# Patient Record
Sex: FEMALE | Race: ASIAN | NOT HISPANIC OR LATINO | URBAN - METROPOLITAN AREA
[De-identification: names, ages, dates, MRNs, and addresses within clinical notes are randomized per-mention and may not be internally consistent; named-entity substitution may affect disease eponyms.]

---

## 2021-03-25 ENCOUNTER — INPATIENT (INPATIENT)
Facility: HOSPITAL | Age: 31
LOS: 2 days | Discharge: HOME | End: 2021-03-28
Attending: OBSTETRICS & GYNECOLOGY | Admitting: OBSTETRICS & GYNECOLOGY
Payer: COMMERCIAL

## 2021-03-25 VITALS — OXYGEN SATURATION: 100 %

## 2021-03-25 DIAGNOSIS — Z34.80 ENCOUNTER FOR SUPERVISION OF OTHER NORMAL PREGNANCY, UNSPECIFIED TRIMESTER: ICD-10-CM

## 2021-03-25 DIAGNOSIS — Z3A.00 WEEKS OF GESTATION OF PREGNANCY NOT SPECIFIED: ICD-10-CM

## 2021-03-25 DIAGNOSIS — O26.899 OTHER SPECIFIED PREGNANCY RELATED CONDITIONS, UNSPECIFIED TRIMESTER: ICD-10-CM

## 2021-03-25 LAB
BASOPHILS # BLD AUTO: 0.03 K/UL — SIGNIFICANT CHANGE UP (ref 0–0.2)
BASOPHILS NFR BLD AUTO: 0.3 % — SIGNIFICANT CHANGE UP (ref 0–2)
BLD GP AB SCN SERPL QL: NEGATIVE — SIGNIFICANT CHANGE UP
EOSINOPHIL # BLD AUTO: 0.08 K/UL — SIGNIFICANT CHANGE UP (ref 0–0.5)
EOSINOPHIL NFR BLD AUTO: 0.7 % — SIGNIFICANT CHANGE UP (ref 0–6)
HCT VFR BLD CALC: 30.4 % — LOW (ref 34.5–45)
HGB BLD-MCNC: 9.2 G/DL — LOW (ref 11.5–15.5)
IMM GRANULOCYTES NFR BLD AUTO: 0.9 % — SIGNIFICANT CHANGE UP (ref 0–1.5)
LYMPHOCYTES # BLD AUTO: 1.87 K/UL — SIGNIFICANT CHANGE UP (ref 1–3.3)
LYMPHOCYTES # BLD AUTO: 16.9 % — SIGNIFICANT CHANGE UP (ref 13–44)
MCHC RBC-ENTMCNC: 23.4 PG — LOW (ref 27–34)
MCHC RBC-ENTMCNC: 30.3 GM/DL — LOW (ref 32–36)
MCV RBC AUTO: 77.2 FL — LOW (ref 80–100)
MONOCYTES # BLD AUTO: 0.79 K/UL — SIGNIFICANT CHANGE UP (ref 0–0.9)
MONOCYTES NFR BLD AUTO: 7.1 % — SIGNIFICANT CHANGE UP (ref 2–14)
NEUTROPHILS # BLD AUTO: 8.22 K/UL — HIGH (ref 1.8–7.4)
NEUTROPHILS NFR BLD AUTO: 74.1 % — SIGNIFICANT CHANGE UP (ref 43–77)
NRBC # BLD: 0 /100 WBCS — SIGNIFICANT CHANGE UP (ref 0–0)
PLATELET # BLD AUTO: 367 K/UL — SIGNIFICANT CHANGE UP (ref 150–400)
RBC # BLD: 3.94 M/UL — SIGNIFICANT CHANGE UP (ref 3.8–5.2)
RBC # FLD: 16.3 % — HIGH (ref 10.3–14.5)
RH IG SCN BLD-IMP: POSITIVE — SIGNIFICANT CHANGE UP
SARS-COV-2 RNA SPEC QL NAA+PROBE: SIGNIFICANT CHANGE UP
WBC # BLD: 11.09 K/UL — HIGH (ref 3.8–10.5)
WBC # FLD AUTO: 11.09 K/UL — HIGH (ref 3.8–10.5)

## 2021-03-25 RX ORDER — SODIUM CHLORIDE 9 MG/ML
1000 INJECTION, SOLUTION INTRAVENOUS
Refills: 0 | Status: DISCONTINUED | OUTPATIENT
Start: 2021-03-25 | End: 2021-03-27

## 2021-03-25 RX ORDER — OXYTOCIN 10 UNIT/ML
333.33 VIAL (ML) INJECTION
Qty: 20 | Refills: 0 | Status: DISCONTINUED | OUTPATIENT
Start: 2021-03-25 | End: 2021-03-28

## 2021-03-25 RX ORDER — CITRIC ACID/SODIUM CITRATE 300-500 MG
15 SOLUTION, ORAL ORAL EVERY 6 HOURS
Refills: 0 | Status: DISCONTINUED | OUTPATIENT
Start: 2021-03-25 | End: 2021-03-27

## 2021-03-25 RX ADMIN — SODIUM CHLORIDE 125 MILLILITER(S): 9 INJECTION, SOLUTION INTRAVENOUS at 22:00

## 2021-03-25 NOTE — OB PROVIDER H&P - NS_ZIKATRAVEL_OBGYN_ALL_OB
Rapid intubation needed per attending MD.     0940-2mg of Versed given  0942-150mg Ketamine given  0942-5mg Rocuronium given    Bilateral breath sounds heard after ETT insertion.     Elie Roberson RN    No

## 2021-03-25 NOTE — OB PROVIDER H&P - NSHPPHYSICALEXAM_GEN_ALL_CORE
Vital Signs Last 24 Hrs  T(C): 36.8 (25 Mar 2021 20:39), Max: 37 (25 Mar 2021 19:41)  T(F): 98.24 (25 Mar 2021 20:39), Max: 98.6 (25 Mar 2021 19:41)  HR: 113 (25 Mar 2021 20:53) (107 - 120)  BP: 110/82 (25 Mar 2021 20:53) (103/75 - 144/84)  BP(mean): --  RR: 14 (25 Mar 2021 19:41) (14 - 14)  SpO2: 92% (25 Mar 2021 20:52) (92% - 100%)    General: NAD, A&Ox3  CV: RRR  Lungs: CTA b/l  Abdomen: Soft, NT, gravid

## 2021-03-25 NOTE — OB PROVIDER TRIAGE NOTE - NSOBPROVIDERNOTE_OBGYN_ALL_OB_FT
A/P:  30y  @37wks and 5 days gestation presenting with LOF. Patient r/o for ROM. +FM  -NST  -ALEJANDRO  To be d/w Dr. Ruddy Block PA-C

## 2021-03-25 NOTE — OB PROVIDER H&P - NS_PRENATALMEDS_OBGYN_A_OB
Spoke with mom and gave her advice  She will call back if no better in a few days  Prenatal Vitamins

## 2021-03-25 NOTE — OB PROVIDER H&P - HISTORY OF PRESENT ILLNESS
30y  @37wks and 5 days gestation presenting with LOF. Patient admits to feeling a trickle of fluid @330pm. She states it only happened once and she hasn't felt any more leaking since. She denies a gush or continuous leaking. She denies ctx's or VB. PNC uncomplicated. +FM. GBS -.     POBHx: Denies  PGYNhx: Denies fibroids, ovarian cysts, abnormal pap smears, STD's  PMhx: Hx of tachycardia-no meds, states she saw a MD in [place]  Medications: PNV  Allergies: NKDA  PSHx: Denies  Social Hx: Denies etoh/tobacco/drug use. Denies anxiety/depression    Vital Signs Last 24 Hrs  T(C): 36.8 (25 Mar 2021 20:39), Max: 37 (25 Mar 2021 19:41)  T(F): 98.24 (25 Mar 2021 20:39), Max: 98.6 (25 Mar 2021 19:41)  HR: 113 (25 Mar 2021 20:53) (107 - 120)  BP: 110/82 (25 Mar 2021 20:53) (103/75 - 144/84)  BP(mean): --  RR: 14 (25 Mar 2021 19:41) (14 - 14)  SpO2: 92% (25 Mar 2021 20:52) (92% - 100%)    General: NAD, A&Ox3  CV: RRR  Lungs: CTA b/l  Abdomen: Soft, NT, gravid    SSE: - pooling, - nitrazine, - ferning  VE: 1/50/-3  Bedside sono: Vertex, ALEJANDRO 3 (performed with Dr. Claudio)  EFM: 140/moderate variability/+accels/no decels  [place]: 1 ctx noted

## 2021-03-25 NOTE — OB PROVIDER TRIAGE NOTE - NSHPPHYSICALEXAM_GEN_ALL_CORE
Vital Signs Last 24 Hrs  T(C): 37 (25 Mar 2021 19:41), Max: 37 (25 Mar 2021 19:41)  T(F): 98.6 (25 Mar 2021 19:41), Max: 98.6 (25 Mar 2021 19:41)  HR: 114 (25 Mar 2021 20:01) (108 - 115)  BP: 128/77 (25 Mar 2021 19:54) (128/77 - 144/84)  BP(mean): --  RR: 14 (25 Mar 2021 19:41) (14 - 14)  SpO2: 99% (25 Mar 2021 20:01) (99% - 100%)    General: NAD, A&Ox3  CV: RRR  Lungs: CTA b/l  Abdomen: Soft, NT, gravid

## 2021-03-25 NOTE — OB PROVIDER H&P - ASSESSMENT
A/P:  30y  @37wks and 5 days gestation presenting with LOF. Nitrazine/pooling/fern were all negative but ALEJANDRO performed and noted to be 3. Patient for IOL for suspected PROM@330pm/[place]. +FM. GBS -. EFW 3100g  -Admit to L&D  -Routine labs  -EFM/[place]  -NPO, IVF, Bicitra  -IOL with PO cytotec  -COVID swab  -First BP noted to be mildly elevated but subsequent BP's WNL. Will continue to monitor   -Anesthesia consult, epidural PRN  -Anticipate   D/w Dr. Ruddy Block PA-C

## 2021-03-25 NOTE — OB PROVIDER TRIAGE NOTE - HISTORY OF PRESENT ILLNESS
30y  @37wks and 5 days gestation presenting with LOF. Patient admits to feeling a trickle of fluid @330pm. She states it only happened once and she hasn't felt any more leaking since. She denies a gush or continuous leaking. She denies ctx's or VB. PNC uncomplicated. +FM. GBS -.     POBHx: Denies  PGYNhx: Denies fibroids, ovarian cysts, abnormal pap smears, STD's  PMhx: Hx of tachycardia-no meds, states she saw a MD in [place]  Medications: PNV  Allergies: NKDA  PSHx: Denies  Social Hx: Denies etoh/tobacco/drug use. Denies anxiety/depression    Vital Signs Last 24 Hrs  T(C): 37 (25 Mar 2021 19:41), Max: 37 (25 Mar 2021 19:41)  T(F): 98.6 (25 Mar 2021 19:41), Max: 98.6 (25 Mar 2021 19:41)  HR: 114 (25 Mar 2021 20:01) (108 - 115)  BP: 128/77 (25 Mar 2021 19:54) (128/77 - 144/84)  BP(mean): --  RR: 14 (25 Mar 2021 19:41) (14 - 14)  SpO2: 99% (25 Mar 2021 20:01) (99% - 100%)    General: NAD, A&Ox3  CV: RRR  Lungs: CTA b/l  Abdomen: Soft, NT, gravid    SSE: - pooling, - nitrazine, - ferning  VE: 1/50/-3  EFM: 140/moderate variability/+accels/no decels  [place]: 1 ctx noted

## 2021-03-26 LAB
COVID-19 SPIKE DOMAIN AB INTERP: NEGATIVE — SIGNIFICANT CHANGE UP
COVID-19 SPIKE DOMAIN ANTIBODY RESULT: 0.4 U/ML — SIGNIFICANT CHANGE UP
SARS-COV-2 IGG+IGM SERPL QL IA: 0.4 U/ML — SIGNIFICANT CHANGE UP
SARS-COV-2 IGG+IGM SERPL QL IA: NEGATIVE — SIGNIFICANT CHANGE UP
T PALLIDUM AB TITR SER: NEGATIVE — SIGNIFICANT CHANGE UP

## 2021-03-26 RX ORDER — KETOROLAC TROMETHAMINE 30 MG/ML
30 SYRINGE (ML) INJECTION ONCE
Refills: 0 | Status: DISCONTINUED | OUTPATIENT
Start: 2021-03-26 | End: 2021-03-28

## 2021-03-26 RX ORDER — DIBUCAINE 1 %
1 OINTMENT (GRAM) RECTAL EVERY 6 HOURS
Refills: 0 | Status: DISCONTINUED | OUTPATIENT
Start: 2021-03-26 | End: 2021-03-28

## 2021-03-26 RX ORDER — OXYTOCIN 10 UNIT/ML
4 VIAL (ML) INJECTION
Qty: 30 | Refills: 0 | Status: DISCONTINUED | OUTPATIENT
Start: 2021-03-26 | End: 2021-03-28

## 2021-03-26 RX ORDER — OXYTOCIN 10 UNIT/ML
333.33 VIAL (ML) INJECTION
Qty: 20 | Refills: 0 | Status: DISCONTINUED | OUTPATIENT
Start: 2021-03-26 | End: 2021-03-28

## 2021-03-26 RX ORDER — OXYCODONE HYDROCHLORIDE 5 MG/1
5 TABLET ORAL ONCE
Refills: 0 | Status: DISCONTINUED | OUTPATIENT
Start: 2021-03-26 | End: 2021-03-28

## 2021-03-26 RX ORDER — BENZOCAINE 10 %
1 GEL (GRAM) MUCOUS MEMBRANE EVERY 6 HOURS
Refills: 0 | Status: DISCONTINUED | OUTPATIENT
Start: 2021-03-26 | End: 2021-03-28

## 2021-03-26 RX ORDER — SIMETHICONE 80 MG/1
80 TABLET, CHEWABLE ORAL EVERY 4 HOURS
Refills: 0 | Status: DISCONTINUED | OUTPATIENT
Start: 2021-03-26 | End: 2021-03-28

## 2021-03-26 RX ORDER — AER TRAVELER 0.5 G/1
1 SOLUTION RECTAL; TOPICAL EVERY 4 HOURS
Refills: 0 | Status: DISCONTINUED | OUTPATIENT
Start: 2021-03-26 | End: 2021-03-28

## 2021-03-26 RX ORDER — SODIUM CHLORIDE 9 MG/ML
3 INJECTION INTRAMUSCULAR; INTRAVENOUS; SUBCUTANEOUS EVERY 8 HOURS
Refills: 0 | Status: DISCONTINUED | OUTPATIENT
Start: 2021-03-26 | End: 2021-03-28

## 2021-03-26 RX ORDER — SODIUM CHLORIDE 9 MG/ML
1000 INJECTION INTRAMUSCULAR; INTRAVENOUS; SUBCUTANEOUS ONCE
Refills: 0 | Status: COMPLETED | OUTPATIENT
Start: 2021-03-26 | End: 2021-03-26

## 2021-03-26 RX ORDER — HYDROCORTISONE 1 %
1 OINTMENT (GRAM) TOPICAL EVERY 6 HOURS
Refills: 0 | Status: DISCONTINUED | OUTPATIENT
Start: 2021-03-26 | End: 2021-03-28

## 2021-03-26 RX ORDER — MAGNESIUM HYDROXIDE 400 MG/1
30 TABLET, CHEWABLE ORAL
Refills: 0 | Status: DISCONTINUED | OUTPATIENT
Start: 2021-03-26 | End: 2021-03-28

## 2021-03-26 RX ORDER — OXYCODONE HYDROCHLORIDE 5 MG/1
5 TABLET ORAL
Refills: 0 | Status: DISCONTINUED | OUTPATIENT
Start: 2021-03-26 | End: 2021-03-28

## 2021-03-26 RX ORDER — DIPHENHYDRAMINE HCL 50 MG
25 CAPSULE ORAL EVERY 6 HOURS
Refills: 0 | Status: DISCONTINUED | OUTPATIENT
Start: 2021-03-26 | End: 2021-03-28

## 2021-03-26 RX ORDER — OXYTOCIN 10 UNIT/ML
2 VIAL (ML) INJECTION
Qty: 30 | Refills: 0 | Status: DISCONTINUED | OUTPATIENT
Start: 2021-03-26 | End: 2021-03-28

## 2021-03-26 RX ORDER — IBUPROFEN 200 MG
600 TABLET ORAL EVERY 6 HOURS
Refills: 0 | Status: COMPLETED | OUTPATIENT
Start: 2021-03-26 | End: 2022-02-22

## 2021-03-26 RX ORDER — ACETAMINOPHEN 500 MG
975 TABLET ORAL
Refills: 0 | Status: DISCONTINUED | OUTPATIENT
Start: 2021-03-26 | End: 2021-03-28

## 2021-03-26 RX ORDER — TETANUS TOXOID, REDUCED DIPHTHERIA TOXOID AND ACELLULAR PERTUSSIS VACCINE, ADSORBED 5; 2.5; 8; 8; 2.5 [IU]/.5ML; [IU]/.5ML; UG/.5ML; UG/.5ML; UG/.5ML
0.5 SUSPENSION INTRAMUSCULAR ONCE
Refills: 0 | Status: DISCONTINUED | OUTPATIENT
Start: 2021-03-26 | End: 2021-03-28

## 2021-03-26 RX ORDER — LANOLIN
1 OINTMENT (GRAM) TOPICAL EVERY 6 HOURS
Refills: 0 | Status: DISCONTINUED | OUTPATIENT
Start: 2021-03-26 | End: 2021-03-28

## 2021-03-26 RX ORDER — PRAMOXINE HYDROCHLORIDE 150 MG/15G
1 AEROSOL, FOAM RECTAL EVERY 4 HOURS
Refills: 0 | Status: DISCONTINUED | OUTPATIENT
Start: 2021-03-26 | End: 2021-03-28

## 2021-03-26 RX ADMIN — Medication 4 MILLIUNIT(S)/MIN: at 18:00

## 2021-03-26 RX ADMIN — SODIUM CHLORIDE 1000 MILLILITER(S): 9 INJECTION INTRAMUSCULAR; INTRAVENOUS; SUBCUTANEOUS at 20:30

## 2021-03-26 RX ADMIN — Medication 2 MILLIUNIT(S)/MIN: at 18:46

## 2021-03-26 RX ADMIN — Medication 1000 MILLIUNIT(S)/MIN: at 22:51

## 2021-03-26 NOTE — CHART NOTE - NSCHARTNOTEFT_GEN_A_CORE
Ob attending note    Pt doing well after epi placement.    Vital Signs Last 24 Hrs  T(C): 37 (26 Mar 2021 14:28), Max: 37 (25 Mar 2021 19:41)  T(F): 98.6 (26 Mar 2021 12:56), Max: 98.6 (25 Mar 2021 19:41)  HR: 72 (26 Mar 2021 15:20) (63 - 120)  BP: 113/64 (26 Mar 2021 15:20) (103/75 - 144/84)  BP(mean): --  RR: 20 (26 Mar 2021 14:28) (14 - 20)  SpO2: 100% (26 Mar 2021 15:17) (92% - 100%)    VE: 2.5/70/-3  EFM: 135bl/mod/+accels/-decels  [place]: q2min    IOL for likely PROM/oligo  -pit for IOL prn  -efm/toco  -peanut ball     MD Sen
Ob attending note    Pt examined for cervical change.    Vital Signs Last 24 Hrs  T(C): 36.5 (26 Mar 2021 17:31), Max: 37 (25 Mar 2021 19:41)  T(F): 97.7 (26 Mar 2021 17:31), Max: 98.6 (25 Mar 2021 19:41)  HR: 82 (26 Mar 2021 17:47) (60 - 120)  BP: 120/62 (26 Mar 2021 17:47) (103/75 - 144/84)  BP(mean): --  RR: 18 (26 Mar 2021 17:31) (14 - 20)  SpO2: 99% (26 Mar 2021 17:47) (92% - 100%)    VE: 4/80/-3  EFM: 135bl/mod/accels/occasional variable decels; overall reassuring  [place]: q2-4min; IUPC placed for pit titration    Plan:  -c/w pit for induction, titrate with IUPC  -efm/toco  -emmett Chatterjee MD
PA Labor Note    Pt. with PPROM with PO cytotec IOL, c/o painful ctx, requesting epidural.  EFM:  mod. variability, (+) 15 BPM accels, ctx Q 2 min.  VE:  2/70/-3/P/mod. tone  Pt. to receive epidural.    JERMAINE Gan

## 2021-03-26 NOTE — OB PROVIDER LABOR PROGRESS NOTE - ASSESSMENT
A/P:  - EFM: Cat II, moderate variability, overall reassuring  - anticipate   - Dr. Weir en route    MORIS ToroC

## 2021-03-26 NOTE — OB PROVIDER LABOR PROGRESS NOTE - NS_SUBJECTIVE/OBJECTIVE_OBGYN_ALL_OB_FT
OB PA Progress Note    Pt seen and evaluated, c/o increased rectal pressure.    Exam  VSS  SVE: 9/100/0  EFM: 140bpm, moderate variability, +accels, +variable decels, non-recurrent, improved with amnioinfusion  [place]: Q2min
Pt comfortable, Examined to assess cervical change

## 2021-03-26 NOTE — OB RN DELIVERY SUMMARY - NS_SEPSISRSKCALC_OBGYN_ALL_OB_FT
EOS calculated successfully. EOS Risk Factor: 0.5/1000 live births ([facility] national incidence); GA=37w6d; Temp=99.86; ROM=6.9; GBS='Negative'; Antibiotics='No antibiotics or any antibiotics < 2 hrs prior to birth'

## 2021-03-26 NOTE — OB PROVIDER DELIVERY SUMMARY - NSPROVIDERDELIVERYNOTE_OBGYN_ALL_OB_FT
Patient fully dilated and pushing. Spontaneous vaginal delivery of liveborn infant from DAVY position. Head, shoulders, and body delivered with ease. Infant was suctioned. Clear amniotic fluid. Cord was clamped and cut after 60 seconds of delayed cord clamping and infant was passed to mother/peds. Placenta delivered intact with a 3 vessel cord. Fundal massage was given and uterine fundus was found to be firm. Vaginal exam revealed an intact cervix, vaginal walls and sulci. RML performed and repaired. Patient had a 2nd degree laceration in the perineum that was repaired with vicryl suture. Excellent hemostasis was noted. Patient was stable. Count was correct x 2. EBL 400cc.    Surgeon Dr. Weir. Assistant Evelyn Coker PA-C

## 2021-03-26 NOTE — PRE-ANESTHESIA EVALUATION ADULT - NSANTHADDINFOFT_GEN_ALL_CORE
epidural for labor explained to pt in detail;  risks include but not limited to HA, failure, nv injury.  All questions answered.

## 2021-03-27 RX ORDER — IBUPROFEN 200 MG
600 TABLET ORAL EVERY 6 HOURS
Refills: 0 | Status: DISCONTINUED | OUTPATIENT
Start: 2021-03-27 | End: 2021-03-28

## 2021-03-27 RX ADMIN — Medication 1 TABLET(S): at 13:14

## 2021-03-27 RX ADMIN — Medication 600 MILLIGRAM(S): at 14:00

## 2021-03-27 RX ADMIN — Medication 600 MILLIGRAM(S): at 13:13

## 2021-03-27 NOTE — PROGRESS NOTE ADULT - ATTENDING COMMENTS
PPD#1  Pt doing well, VS stable - noted mild tachycardia (pt notes h/o tachycardia, last VS nl  voiding spontaneously, tolerating PO, ambulating  continue with postpartum care    MD Sen

## 2021-03-28 VITALS
RESPIRATION RATE: 18 BRPM | HEART RATE: 91 BPM | DIASTOLIC BLOOD PRESSURE: 73 MMHG | SYSTOLIC BLOOD PRESSURE: 114 MMHG | OXYGEN SATURATION: 97 % | TEMPERATURE: 98 F

## 2021-03-28 PROCEDURE — 85025 COMPLETE CBC W/AUTO DIFF WBC: CPT

## 2021-03-28 PROCEDURE — 86900 BLOOD TYPING SEROLOGIC ABO: CPT

## 2021-03-28 PROCEDURE — G0463: CPT

## 2021-03-28 PROCEDURE — 59025 FETAL NON-STRESS TEST: CPT

## 2021-03-28 PROCEDURE — 59050 FETAL MONITOR W/REPORT: CPT

## 2021-03-28 PROCEDURE — 86850 RBC ANTIBODY SCREEN: CPT

## 2021-03-28 PROCEDURE — 86780 TREPONEMA PALLIDUM: CPT

## 2021-03-28 PROCEDURE — 86901 BLOOD TYPING SEROLOGIC RH(D): CPT

## 2021-03-28 PROCEDURE — 86769 SARS-COV-2 COVID-19 ANTIBODY: CPT

## 2021-03-28 PROCEDURE — 87635 SARS-COV-2 COVID-19 AMP PRB: CPT

## 2021-03-28 RX ADMIN — Medication 600 MILLIGRAM(S): at 07:00

## 2021-03-28 RX ADMIN — Medication 1 TABLET(S): at 15:02

## 2021-03-28 RX ADMIN — Medication 975 MILLIGRAM(S): at 15:02

## 2021-03-28 RX ADMIN — Medication 600 MILLIGRAM(S): at 06:16

## 2021-03-28 NOTE — DISCHARGE NOTE OB - PATIENT PORTAL LINK FT
You can access the FollowMyHealth Patient Portal offered by [facility] by registering at the following website: [email or web address]. By joining [employer]’s FollowMyHealth portal, you will also be able to view your health information using other applications (apps) compatible with our system.

## 2021-03-28 NOTE — DISCHARGE NOTE OB - MEDICATION SUMMARY - MEDICATIONS TO TAKE
I will START or STAY ON the medications listed below when I get home from the hospital:    Motrin 600 mg oral tablet  -- 1 tab(s) by mouth every 6 hours  -- Indication: For pain med    Tylenol 325 mg oral capsule  -- 2 cap(s) by mouth every 8 hours, As Needed  -- Indication: For pain med    Prenatal Multivitamins oral tablet  -- Indication: For home med

## 2021-03-28 NOTE — DISCHARGE NOTE OB - CARE PROVIDER_API CALL
Eulalia Weir  RESIDENCY - OBSTETRIC-GYN  [address] [place] [address]  Phone: [phone]  Fax: [phone]  Follow Up Time:

## 2021-03-28 NOTE — PROGRESS NOTE ADULT - SUBJECTIVE AND OBJECTIVE BOX
OB Progress Note:  PPD#2    S: 29yo G P PPD#2 s/p . Patient feels well. Pain is well controlled. She is tolerating a regular diet and passing flatus. She is voiding spontaneously, and ambulating without difficulty. Denies CP/SOB. Denies lightheadedness/dizziness. Denies N/V.    O:  Vitals:   Vital Signs Last 24 Hrs  T(C): 36.6 (28 Mar 2021 09:00), Max: 36.9 (27 Mar 2021 17:33)  T(F): 97.9 (28 Mar 2021 09:00), Max: 98.5 (27 Mar 2021 17:33)  HR: 91 (28 Mar 2021 09:00) (82 - 106)  BP: 114/73 (28 Mar 2021 09:00) (102/65 - 114/73)  RR: 18 (28 Mar 2021 09:00) (18 - 18)  SpO2: 97% (28 Mar 2021 09:00) (95% - 99%)    MEDICATIONS  (STANDING):  acetaminophen   Tablet .. 975 milliGRAM(s) Oral <User Schedule>  diphtheria/tetanus/pertussis (acellular) Vaccine (ADAcel) 0.5 milliLiter(s) IntraMuscular once  ibuprofen  Tablet. 600 milliGRAM(s) Oral every 6 hours  ketorolac   Injectable 30 milliGRAM(s) IV Push once  misoprostol Oral Solution 60 MICROGram(s) Oral every 2 hours  oxytocin Infusion 333.333 milliUNIT(s)/Min (1000 mL/Hr) IV Continuous <Continuous>  oxytocin Infusion 333.333 milliUNIT(s)/Min (1000 mL/Hr) IV Continuous <Continuous>  oxytocin Infusion. 2 milliUNIT(s)/Min (2 mL/Hr) IV Continuous <Continuous>  oxytocin Infusion. 4 milliUNIT(s)/Min (4 mL/Hr) IV Continuous <Continuous>  prenatal multivitamin 1 Tablet(s) Oral daily  sodium chloride 0.9% lock flush 3 milliLiter(s) IV Push every 8 hours    MEDICATIONS  (PRN):  benzocaine 20%/menthol 0.5% Spray 1 Spray(s) Topical every 6 hours PRN for Perineal discomfort  dibucaine 1% Ointment 1 Application(s) Topical every 6 hours PRN Perineal discomfort  diphenhydrAMINE 25 milliGRAM(s) Oral every 6 hours PRN Pruritus  hydrocortisone 1% Cream 1 Application(s) Topical every 6 hours PRN Moderate Pain (4-6)  lanolin Ointment 1 Application(s) Topical every 6 hours PRN nipple soreness  magnesium hydroxide Suspension 30 milliLiter(s) Oral two times a day PRN Constipation  oxyCODONE    IR 5 milliGRAM(s) Oral every 3 hours PRN Moderate to Severe Pain (4-10)  oxyCODONE    IR 5 milliGRAM(s) Oral once PRN Moderate to Severe Pain (4-10)  pramoxine 1%/zinc 5% Cream 1 Application(s) Topical every 4 hours PRN Moderate Pain (4-6)  simethicone 80 milliGRAM(s) Chew every 4 hours PRN Gas  witch hazel Pads 1 Application(s) Topical every 4 hours PRN Perineal discomfort      Labs:  Blood type: B Positive  Rubella IgG: RPR: Negative                          9.2<L>   11.09<H> >-----------< 367    (  @ 22:19 )             30.4<L>    Physical Exam:  General: NAD  Abdomen: soft, non-tender, non-distended, fundus firm  Vaginal: Lochia wnl  Extremities: No erythema/edema    
PA Postpartum Note- PPD#1    Prenatal Labs  Blood type: B Positive  Rubella IgG: IMMune  RPR: Negative        S:Patient w/o complaints, pain is controlled.    Pt is OOB, tolerating PO, voiding. Lochia WNL.     O:  Vital Signs Last 24 Hrs  T(C): 36.6 (27 Mar 2021 06:52), Max: 37.7 (26 Mar 2021 21:29)  T(F): 97.9 (27 Mar 2021 06:52), Max: 99.86 (26 Mar 2021 21:29)  HR: 112 (27 Mar 2021 06:52) (60 - 133)  BP: 105/73 (27 Mar 2021 06:52) (97/58 - 135/67)  BP(mean): 72 (27 Mar 2021 00:30) (71 - 91)  RR: 19 (27 Mar 2021 06:52) (16 - 20)  SpO2: 99% (27 Mar 2021 06:52) (84% - 100%)     Gen: NAD  Abdomen: Soft, nontender, non-distended, fundus firm.  Vaginal: Lochia WNL,    Ext: Neg edema, Neg calf tenderness    LABS:    Hemoglobin: 9.2 g/dL (03-25 @ 22:19)      Hematocrit: 30.4 % (03-25 @ 22:19)

## 2021-03-28 NOTE — PROGRESS NOTE ADULT - ASSESSMENT
A/P: 31yo GP PPD#2 s/p .  Patient is stable and doing well post-partum.    - Pain well controlled, continue current pain regimen  - Increase ambulation, SCDs when not ambulating  - Continue regular diet  - Discharge planning     MD Sen
A/P:  30y G11 PPD # 1   S/P     Doing Well    PMHx: Hx of tachycardia-no meds, states she saw a MD in [place]  Current Issues: mildly tachycardic- pt has h/o

## 2021-03-28 NOTE — DISCHARGE NOTE OB - CARE PLAN
Principal Discharge DX:	 (normal spontaneous vaginal delivery)  Goal:	continue routine postpartum care  Assessment and plan of treatment:	Take tylenol and motrin as directed, alternating every 3 hours.   Continue iron and prenatal vitamin daily. Take colace and mylicon as needed for constipation and gas pain.   Nothing in the vagina and no exercise until 6 week visit. You may continue bleeding for several weeks.  Follow up in the office in 6 weeks for full postpartum visit.   Call the office for appointment confirmation and with any concerns, including breast infection, wound infection, postpartum depression, high blood pressure, and painful calves.

## 2021-03-28 NOTE — DISCHARGE NOTE OB - PLAN OF CARE
continue routine postpartum care Take tylenol and motrin as directed, alternating every 3 hours.   Continue iron and prenatal vitamin daily. Take colace and mylicon as needed for constipation and gas pain.   Nothing in the vagina and no exercise until 6 week visit. You may continue bleeding for several weeks.  Follow up in the office in 6 weeks for full postpartum visit.   Call the office for appointment confirmation and with any concerns, including breast infection, wound infection, postpartum depression, high blood pressure, and painful calves.

## 2022-05-02 NOTE — OB PROVIDER TRIAGE NOTE - GRAVIDA, OB PROFILE
To talk to our  -  [phone]   Press 0 - to talk to Yesica  8:30 am -5:00 pm  After those hours or if you press 1 or 2 - Central scheduling will answer.  Call  for same day and short notice appointments.    Video Appointments -Live well hugo  Help Desk for Patients  [phone]      Closest lab and radiology department  [facility] Information -  [address] [place] [address] [phone]  8 am to 4 pm   No appointment needed.     Labs and other Test Results - These may take 1 and up to 7 days depending on the test.   Results will be shared with you over the patient portal as these are resulted.         
1

## 2023-08-07 PROBLEM — R00.0 TACHYCARDIA, UNSPECIFIED: Chronic | Status: ACTIVE | Noted: 2021-03-25

## 2023-08-31 ENCOUNTER — INPATIENT (INPATIENT)
Facility: HOSPITAL | Age: 33
LOS: 0 days | Discharge: HOME | End: 2023-09-01
Attending: OBSTETRICS & GYNECOLOGY | Admitting: OBSTETRICS & GYNECOLOGY
Payer: COMMERCIAL

## 2023-08-31 VITALS — SYSTOLIC BLOOD PRESSURE: 124 MMHG | HEART RATE: 98 BPM | DIASTOLIC BLOOD PRESSURE: 82 MMHG

## 2023-08-31 DIAGNOSIS — Z3A.39 39 WEEKS GESTATION OF PREGNANCY: ICD-10-CM

## 2023-08-31 DIAGNOSIS — Z98.890 OTHER SPECIFIED POSTPROCEDURAL STATES: Chronic | ICD-10-CM

## 2023-08-31 LAB
ANISOCYTOSIS BLD QL: SIGNIFICANT CHANGE UP
BASOPHILS # BLD AUTO: 0.17 K/UL — SIGNIFICANT CHANGE UP (ref 0–0.2)
BASOPHILS NFR BLD AUTO: 1.9 % — SIGNIFICANT CHANGE UP (ref 0–2)
EOSINOPHIL # BLD AUTO: 0 K/UL — SIGNIFICANT CHANGE UP (ref 0–0.5)
EOSINOPHIL NFR BLD AUTO: 0 % — SIGNIFICANT CHANGE UP (ref 0–6)
GIANT PLATELETS BLD QL SMEAR: PRESENT — SIGNIFICANT CHANGE UP
HCT VFR BLD CALC: 35.6 % — SIGNIFICANT CHANGE UP (ref 34.5–45)
HGB BLD-MCNC: 11.2 G/DL — LOW (ref 11.5–15.5)
LYMPHOCYTES # BLD AUTO: 1.45 K/UL — SIGNIFICANT CHANGE UP (ref 1–3.3)
LYMPHOCYTES # BLD AUTO: 16.5 % — SIGNIFICANT CHANGE UP (ref 13–44)
MANUAL SMEAR VERIFICATION: SIGNIFICANT CHANGE UP
MCHC RBC-ENTMCNC: 26.1 PG — LOW (ref 27–34)
MCHC RBC-ENTMCNC: 31.5 GM/DL — LOW (ref 32–36)
MCV RBC AUTO: 83 FL — SIGNIFICANT CHANGE UP (ref 80–100)
MICROCYTES BLD QL: SLIGHT — SIGNIFICANT CHANGE UP
MONOCYTES # BLD AUTO: 0.56 K/UL — SIGNIFICANT CHANGE UP (ref 0–0.9)
MONOCYTES NFR BLD AUTO: 6.4 % — SIGNIFICANT CHANGE UP (ref 2–14)
NEUTROPHILS # BLD AUTO: 6.6 K/UL — SIGNIFICANT CHANGE UP (ref 1.8–7.4)
NEUTROPHILS NFR BLD AUTO: 75.2 % — SIGNIFICANT CHANGE UP (ref 43–77)
PLAT MORPH BLD: NORMAL — SIGNIFICANT CHANGE UP
PLATELET # BLD AUTO: 310 K/UL — SIGNIFICANT CHANGE UP (ref 150–400)
POLYCHROMASIA BLD QL SMEAR: SLIGHT — SIGNIFICANT CHANGE UP
RBC # BLD: 4.29 M/UL — SIGNIFICANT CHANGE UP (ref 3.8–5.2)
RBC # FLD: 26.6 % — HIGH (ref 10.3–14.5)
RBC BLD AUTO: ABNORMAL
T PALLIDUM AB TITR SER: NEGATIVE — SIGNIFICANT CHANGE UP
WBC # BLD: 8.77 K/UL — SIGNIFICANT CHANGE UP (ref 3.8–10.5)
WBC # FLD AUTO: 8.77 K/UL — SIGNIFICANT CHANGE UP (ref 3.8–10.5)

## 2023-08-31 RX ORDER — SODIUM CHLORIDE 9 MG/ML
500 INJECTION, SOLUTION INTRAVENOUS ONCE
Refills: 0 | Status: COMPLETED | OUTPATIENT
Start: 2023-08-31 | End: 2023-08-31

## 2023-08-31 RX ORDER — AER TRAVELER 0.5 G/1
1 SOLUTION RECTAL; TOPICAL EVERY 4 HOURS
Refills: 0 | Status: DISCONTINUED | OUTPATIENT
Start: 2023-08-31 | End: 2023-09-01

## 2023-08-31 RX ORDER — OXYTOCIN 10 UNIT/ML
4 VIAL (ML) INJECTION
Qty: 30 | Refills: 0 | Status: DISCONTINUED | OUTPATIENT
Start: 2023-08-31 | End: 2023-09-01

## 2023-08-31 RX ORDER — MAGNESIUM HYDROXIDE 400 MG/1
30 TABLET, CHEWABLE ORAL
Refills: 0 | Status: DISCONTINUED | OUTPATIENT
Start: 2023-08-31 | End: 2023-09-01

## 2023-08-31 RX ORDER — LANOLIN
1 OINTMENT (GRAM) TOPICAL EVERY 6 HOURS
Refills: 0 | Status: DISCONTINUED | OUTPATIENT
Start: 2023-08-31 | End: 2023-09-01

## 2023-08-31 RX ORDER — KETOROLAC TROMETHAMINE 30 MG/ML
30 SYRINGE (ML) INJECTION ONCE
Refills: 0 | Status: DISCONTINUED | OUTPATIENT
Start: 2023-08-31 | End: 2023-08-31

## 2023-08-31 RX ORDER — DIBUCAINE 1 %
1 OINTMENT (GRAM) RECTAL EVERY 6 HOURS
Refills: 0 | Status: DISCONTINUED | OUTPATIENT
Start: 2023-08-31 | End: 2023-09-01

## 2023-08-31 RX ORDER — OXYTOCIN 10 UNIT/ML
41.67 VIAL (ML) INJECTION
Qty: 20 | Refills: 0 | Status: DISCONTINUED | OUTPATIENT
Start: 2023-08-31 | End: 2023-09-01

## 2023-08-31 RX ORDER — PRAMOXINE HYDROCHLORIDE 150 MG/15G
1 AEROSOL, FOAM RECTAL EVERY 4 HOURS
Refills: 0 | Status: DISCONTINUED | OUTPATIENT
Start: 2023-08-31 | End: 2023-09-01

## 2023-08-31 RX ORDER — OXYCODONE HYDROCHLORIDE 5 MG/1
5 TABLET ORAL ONCE
Refills: 0 | Status: DISCONTINUED | OUTPATIENT
Start: 2023-08-31 | End: 2023-09-01

## 2023-08-31 RX ORDER — IBUPROFEN 200 MG
1 TABLET ORAL
Qty: 0 | Refills: 0 | DISCHARGE

## 2023-08-31 RX ORDER — SODIUM CHLORIDE 9 MG/ML
3 INJECTION INTRAMUSCULAR; INTRAVENOUS; SUBCUTANEOUS EVERY 8 HOURS
Refills: 0 | Status: DISCONTINUED | OUTPATIENT
Start: 2023-08-31 | End: 2023-09-01

## 2023-08-31 RX ORDER — SODIUM CHLORIDE 9 MG/ML
1000 INJECTION, SOLUTION INTRAVENOUS
Refills: 0 | Status: DISCONTINUED | OUTPATIENT
Start: 2023-08-31 | End: 2023-08-31

## 2023-08-31 RX ORDER — OXYTOCIN 10 UNIT/ML
333.33 VIAL (ML) INJECTION
Qty: 20 | Refills: 0 | Status: DISCONTINUED | OUTPATIENT
Start: 2023-08-31 | End: 2023-08-31

## 2023-08-31 RX ORDER — OXYCODONE HYDROCHLORIDE 5 MG/1
5 TABLET ORAL
Refills: 0 | Status: DISCONTINUED | OUTPATIENT
Start: 2023-08-31 | End: 2023-09-01

## 2023-08-31 RX ORDER — ACETAMINOPHEN 500 MG
975 TABLET ORAL
Refills: 0 | Status: DISCONTINUED | OUTPATIENT
Start: 2023-08-31 | End: 2023-09-01

## 2023-08-31 RX ORDER — TETANUS TOXOID, REDUCED DIPHTHERIA TOXOID AND ACELLULAR PERTUSSIS VACCINE, ADSORBED 5; 2.5; 8; 8; 2.5 [IU]/.5ML; [IU]/.5ML; UG/.5ML; UG/.5ML; UG/.5ML
0.5 SUSPENSION INTRAMUSCULAR ONCE
Refills: 0 | Status: DISCONTINUED | OUTPATIENT
Start: 2023-08-31 | End: 2023-09-01

## 2023-08-31 RX ORDER — CHLORHEXIDINE GLUCONATE 213 G/1000ML
1 SOLUTION TOPICAL DAILY
Refills: 0 | Status: DISCONTINUED | OUTPATIENT
Start: 2023-08-31 | End: 2023-08-31

## 2023-08-31 RX ORDER — HYDROCORTISONE 1 %
1 OINTMENT (GRAM) TOPICAL EVERY 6 HOURS
Refills: 0 | Status: DISCONTINUED | OUTPATIENT
Start: 2023-08-31 | End: 2023-09-01

## 2023-08-31 RX ORDER — IBUPROFEN 200 MG
600 TABLET ORAL EVERY 6 HOURS
Refills: 0 | Status: COMPLETED | OUTPATIENT
Start: 2023-08-31 | End: 2024-07-29

## 2023-08-31 RX ORDER — DIPHENHYDRAMINE HCL 50 MG
25 CAPSULE ORAL EVERY 6 HOURS
Refills: 0 | Status: DISCONTINUED | OUTPATIENT
Start: 2023-08-31 | End: 2023-09-01

## 2023-08-31 RX ORDER — IBUPROFEN 200 MG
600 TABLET ORAL EVERY 6 HOURS
Refills: 0 | Status: DISCONTINUED | OUTPATIENT
Start: 2023-08-31 | End: 2023-09-01

## 2023-08-31 RX ORDER — ACETAMINOPHEN 500 MG
2 TABLET ORAL
Qty: 0 | Refills: 0 | DISCHARGE

## 2023-08-31 RX ORDER — CITRIC ACID/SODIUM CITRATE 300-500 MG
15 SOLUTION, ORAL ORAL EVERY 6 HOURS
Refills: 0 | Status: DISCONTINUED | OUTPATIENT
Start: 2023-08-31 | End: 2023-08-31

## 2023-08-31 RX ORDER — BENZOCAINE 10 %
1 GEL (GRAM) MUCOUS MEMBRANE EVERY 6 HOURS
Refills: 0 | Status: DISCONTINUED | OUTPATIENT
Start: 2023-08-31 | End: 2023-09-01

## 2023-08-31 RX ORDER — SIMETHICONE 80 MG/1
80 TABLET, CHEWABLE ORAL EVERY 4 HOURS
Refills: 0 | Status: DISCONTINUED | OUTPATIENT
Start: 2023-08-31 | End: 2023-09-01

## 2023-08-31 RX ADMIN — Medication 600 MILLIGRAM(S): at 21:49

## 2023-08-31 RX ADMIN — SODIUM CHLORIDE 125 MILLILITER(S): 9 INJECTION, SOLUTION INTRAVENOUS at 02:45

## 2023-08-31 RX ADMIN — Medication 975 MILLIGRAM(S): at 23:39

## 2023-08-31 RX ADMIN — Medication 30 MILLIGRAM(S): at 13:40

## 2023-08-31 RX ADMIN — SODIUM CHLORIDE 125 MILLILITER(S): 9 INJECTION, SOLUTION INTRAVENOUS at 02:00

## 2023-08-31 RX ADMIN — Medication 600 MILLIGRAM(S): at 20:49

## 2023-08-31 RX ADMIN — SODIUM CHLORIDE 1000 MILLILITER(S): 9 INJECTION, SOLUTION INTRAVENOUS at 06:41

## 2023-08-31 RX ADMIN — Medication 125 MILLIUNIT(S)/MIN: at 11:52

## 2023-08-31 RX ADMIN — Medication 125 MILLIUNIT(S)/MIN: at 14:54

## 2023-08-31 RX ADMIN — Medication 4 MILLIUNIT(S)/MIN: at 08:49

## 2023-08-31 NOTE — OB RN DELIVERY SUMMARY - NS_SEPSISRSKCALC_OBGYN_ALL_OB_FT
EOS calculated successfully. EOS Risk Factor: 0.5/1000 live births ([facility] national incidence); GA=39w;Temp=98.06; ROM=1.567; GBS='Negative'; Antibiotics='No antibiotics or any antibiotics < 2 hrs prior to birth'

## 2023-08-31 NOTE — OB PROVIDER H&P - ASSESSMENT
Assessment  32y   @ 39w presents for eIOL.     Plan  1. Admit to L+D. Routine Labs. IVF.  2. IOL w/ buccal cytotec.  3. Fetus: cat 1 tracing. VTX. [id number] extrapolated from sono 2 weeks Continuous EFM. Sono. No concerns.  4. Prenatal issues: anemia on iron  5. GBS (-)  6. Pain: epidural PRN    Plan per attending physician, Dr. Silvio De Paz, PGY-1

## 2023-08-31 NOTE — OB PROVIDER LABOR PROGRESS NOTE - ASSESSMENT
Plan: 32y y/o  @ 39w. FHT recovered from deceleration, no terbutaline administered    - Continuous EFM, [place]  - Con't IVF bolus  - last cytotec administered at 530a, will reassess induction agent at 830a    d/w attending physician Dr. Silvio Rudolph MD  PGY-2
elective IOL  AROM clear  on pit 2mu/min  cont induction  ant   Alda Espinoza MD

## 2023-08-31 NOTE — OB PROVIDER DELIVERY SUMMARY - NSPROVIDERDELIVERYNOTE_OBGYN_ALL_OB_FT
Pt fully and pushing    Delivery of a Viable female infant in the DAVY position. Head, shoulders and body delivered easily. Nuchal cord x1 and reduced. Delayed cord clamping and cut. Infant was passed to mother. Placenta delivered spontaneously and intact with a 3 vessel cord. Fundal massage was given and uterine fundus was found to be firm. Vaginal exam revealed an intact cervix, vaginal walls and sulci. Patient had a 2nd degree perineal laceration which was repaired with a 2.0 and 3.0 chromic gut in normal fashion. Excellent hemostasis was noted. Patient and infant in stable condition. Count was correct x 2.    1000mg rectal cytotec administered    EBL: 300ml Pt fully and pushing    Delivery of a Viable male infant in the DAVY position. Head, shoulders and body delivered easily. Loose nuchal cord x1 and reduced. Delayed cord clamping and cut. Infant was passed to mother. Placenta delivered spontaneously and intact with a 3 vessel cord. Fundal massage was given and uterine fundus was found to be firm. Vaginal exam revealed an intact cervix, vaginal walls and sulci. Patient had a 2nd degree perineal laceration which was repaired with a 2.0 and 3.0 chromic gut in normal fashion. Excellent hemostasis was noted. Patient and infant in stable condition. Count was correct x 2.    1000mg rectal cytotec administered    EBL: 300ml

## 2023-08-31 NOTE — PRE-ANESTHESIA EVALUATION ADULT - NSANTHPMHFT_GEN_ALL_CORE
32F denies PMH. No significant hx of cv/pulm dz, Denies exertional CP/SOB. No signs of active cad/chf. Denies hx of asthma, HTN, bleeding d/o, back issues.

## 2023-08-31 NOTE — OB PROVIDER LABOR PROGRESS NOTE - NS_SUBJECTIVE/OBJECTIVE_OBGYN_ALL_OB_FT
R2 Labor & Delivery Progress Note     Pt seen & examined at bedside for vaginal exam due to prolonged decel down to 60s lasting 3-4min. Abdomen is soft, not shanti    T(C): 36.6 (08-31-23 @ 06:35), Max: 36.6 (08-31-23 @ 01:30)  HR: 81 (08-31-23 @ 06:56) (70 - 102)  BP: 106/59 (08-31-23 @ 06:36) (106/59 - 124/82)  RR: 18 (08-31-23 @ 06:35) (18 - 18)  SpO2: 100% (08-31-23 @ 06:56) (96% - 100%)
comfortable
NP Chart Note:    Notified by the RN for increased rectal pressure after epidural placement.  The patient was examined and found to be 10/100/+2.  Will start pushing    d/w Dr. Olga Cruz NP

## 2023-08-31 NOTE — OB PROVIDER DELIVERY SUMMARY - NS_DELIVERYASSIST1_OBGYN_ALL_OB_FT
Nicole Sweeney MD
Render Note In Bullet Format When Appropriate: No
Duration Of Freeze Thaw-Cycle (Seconds): 0
Post-Care Instructions: I reviewed with the patient in detail post-care instructions. Patient is to wear sunprotection, and avoid picking at any of the treated lesions. Pt may apply Vaseline to crusted or scabbing areas.
Consent: The patient's consent was obtained including but not limited to risks of crusting, scabbing, blistering, scarring, darker or lighter pigmentary change, recurrence, incomplete removal and infection.
Detail Level: Detailed

## 2023-08-31 NOTE — OB RN PATIENT PROFILE - FALL HARM RISK - UNIVERSAL INTERVENTIONS
Bed in lowest position, wheels locked, appropriate side rails in place/Call bell, personal items and telephone in reach/Instruct patient to call for assistance before getting out of bed or chair/Non-slip footwear when patient is out of bed/[place] to call system/Physically safe environment - no spills, clutter or unnecessary equipment/Purposeful Proactive Rounding/Room/bathroom lighting operational, light cord in reach

## 2023-08-31 NOTE — OB PROVIDER H&P - NSLOWPPHRISK_OBGYN_A_OB
No previous uterine incision/[place] Pregnancy/Less than or equal to 4 previous vaginal births/No known bleeding disorder/No history of postpartum hemorrhage/No other PPH risks indicated

## 2023-08-31 NOTE — OB PROVIDER DELIVERY SUMMARY - NSSELHIDDEN_OBGYN_ALL_OB_FT
[NS_DeliveryAttending1_OBGYN_ALL_OB_FT:[id number]],[NS_DeliveryAssist1_OBGYN_ALL_OB_FT:[id number]]

## 2023-08-31 NOTE — OB PROVIDER H&P - HISTORY OF PRESENT ILLNESS
R1 Admission H&P    Subjective  HPI: 32y  @ 39w presents for eIOL.    +FM. -LOF. -CTXs. -VB. Pt denies any other concerns.    – PNC: Anemia on PO iron. GBS neg. EFW 3600g extraplolated from patient reported sono from 2 weeks ago  – OBHx:   G1-   FT, induction for PROM 3200g  – GynHx: hx fibroids unknown size or location (patient reported told not an issue) denies fibroids, cysts, endometriosis, abnormal pap smears, STIs  – PMH: denies  – PSH: cardiac ablation for tachycardia ()   – Psych: denies   – Social: denies   – Meds: PNV, iron   – Allergies: NKDA  – Will accept blood transfusions? Yes

## 2023-08-31 NOTE — OB RN DELIVERY SUMMARY - NSSELHIDDEN_OBGYN_ALL_OB_FT
[NS_[email or web address]_ALL_OB_FT:[id number]],[NS_DeliveryAssist1_OBGYN_ALL_OB_FT:[id number]],[NS_DeliveryRN_OBGYN_ALL_OB_FT:[id number]]

## 2023-08-31 NOTE — OB PROVIDER H&P - NSHPPHYSICALEXAM_GEN_ALL_CORE
Objective  – VS  T(C): 36.6 (08-31-23 @ 01:40)  HR: 89 (08-31-23 @ 01:40)  BP: 124/82 (08-31-23 @ 01:40)  RR: 18 (08-31-23 @ 01:40)  SpO2: 99% (08-31-23 @ 01:40)    Physical Exam  CV: RRR  Pulm: breathing comfortably on RA  Abd: gravid, nontender  Extr: moving all extremities with ease  – FS:   – Spec: pooling, nitrazine, ferning, bleeding,  (lesions if patient with HSV2 history)  – VE: 2.5/60/-3  – FHT: baseline 125, mod variability, +accels, -decels  – [place]: q5min  – EFW: 3600g by sono  – Sono: vertex

## 2023-08-31 NOTE — OB RN PATIENT PROFILE - NS PRO DEPRESSION SCREENING Y/N6
Instructions: This plan will send the code FBSE to the PM system.  DO NOT or CHANGE the price. Detail Level: Generalized Price (Do Not Change): 0.00 no

## 2023-09-01 VITALS
SYSTOLIC BLOOD PRESSURE: 112 MMHG | DIASTOLIC BLOOD PRESSURE: 73 MMHG | TEMPERATURE: 98 F | HEART RATE: 89 BPM | RESPIRATION RATE: 18 BRPM | OXYGEN SATURATION: 99 %

## 2023-09-01 PROCEDURE — 86780 TREPONEMA PALLIDUM: CPT

## 2023-09-01 PROCEDURE — 85025 COMPLETE CBC W/AUTO DIFF WBC: CPT

## 2023-09-01 PROCEDURE — 86901 BLOOD TYPING SEROLOGIC RH(D): CPT

## 2023-09-01 PROCEDURE — 59050 FETAL MONITOR W/REPORT: CPT

## 2023-09-01 PROCEDURE — 86850 RBC ANTIBODY SCREEN: CPT

## 2023-09-01 PROCEDURE — 36415 COLL VENOUS BLD VENIPUNCTURE: CPT

## 2023-09-01 PROCEDURE — 86900 BLOOD TYPING SEROLOGIC ABO: CPT

## 2023-09-01 RX ORDER — ACETAMINOPHEN 500 MG
1 TABLET ORAL
Qty: 0 | Refills: 0 | DISCHARGE

## 2023-09-01 RX ORDER — IBUPROFEN 200 MG
1 TABLET ORAL
Qty: 0 | Refills: 0 | DISCHARGE

## 2023-09-01 RX ADMIN — Medication 975 MILLIGRAM(S): at 00:39

## 2023-09-01 RX ADMIN — Medication 600 MILLIGRAM(S): at 09:59

## 2023-09-01 RX ADMIN — Medication 600 MILLIGRAM(S): at 08:59

## 2023-09-01 NOTE — DISCHARGE NOTE OB - CARE PROVIDER_API CALL
Alda Espinoza  Obstetrics and Gynecology  [address] [facility], [address] [place] [address]  Phone: [phone]  Fax: [phone]  Follow Up Time:

## 2023-09-01 NOTE — DISCHARGE NOTE OB - PATIENT PORTAL LINK FT
You can access the FollowMyHealth Patient Portal offered by [facility] by registering at the following website: [email or web address]. By joining [employer]’s FollowMyHealth portal, you will also be able to view your health information using other applications (apps) compatible with our system.

## 2023-09-01 NOTE — PROGRESS NOTE ADULT - SUBJECTIVE AND OBJECTIVE BOX
Postpartum Note- PPD#1    Blood Type: B Positive  RPR : Negative  Rubella: Immune     S: Patient is a 33yo  PPD#1 s/p .   Patient w/o complaints, pain is controlled.    Pt is OOB, tolerating PO, passing flatus. Lochia WNL.       O:  Vital Signs Last 24 Hrs  T(C): 36.9 (01 Sep 2023 05:49), Max: 37.3 (31 Aug 2023 15:20)  T(F): 98.5 (01 Sep 2023 05:49), Max: 99.1 (31 Aug 2023 15:20)  HR: 89 (01 Sep 2023 05:49) (58 - 95)  BP: 112/73 (01 Sep 2023 05:49) (102/55 - 139/82)  BP(mean): --  RR: 18 (01 Sep 2023 05:49) (18 - 18)  SpO2: 99% (01 Sep 2023 05:49) (89% - 100%)    Parameters below as of 01 Sep 2023 05:49  Patient On (Oxygen Delivery Method): room air    Gen: NAD  Abdomen: Soft, nontender, non-distended, fundus firm.  Vaginal: Lochia WNL  Ext: Neg calf tenderness    LABS:  Hemoglobin: 11.2 g/dL ( @ 02:32)  Hematocrit: 35.6 % ( @ 02:32)

## 2023-09-01 NOTE — DISCHARGE NOTE OB - NS MD DC FALL RISK RISK
For information on Fall & Injury Prevention, visit: [email or web address] OR  [email or web address] OR  https://www.cdc.gov/steadi/patient.html

## 2023-09-01 NOTE — DISCHARGE NOTE OB - MEDICATION SUMMARY - MEDICATIONS TO TAKE
I will START or STAY ON the medications listed below when I get home from the hospital:    Motrin 600 mg oral tablet  -- 1 by mouth every 8 hours  -- Indication: For pain med    Tylenol 500 mg oral tablet  -- orally every 8 hours  -- Indication: For pain med    Prenatal Multivitamins oral tablet  -- Indication: For Home med

## 2024-10-17 NOTE — PRE-ANESTHESIA EVALUATION ADULT - NSANTHTOBACCOSD_GEN_ALL_CORE
----- Message from DANISH Rivera sent at 10/15/2024  6:44 AM CDT -----  Call patient tell thyroid low.  Increase levothyroxine to 150 mcg daily repeat T4 and TSH in 6 weeks   No